# Patient Record
Sex: FEMALE | Race: OTHER | NOT HISPANIC OR LATINO | Employment: UNEMPLOYED | URBAN - METROPOLITAN AREA
[De-identification: names, ages, dates, MRNs, and addresses within clinical notes are randomized per-mention and may not be internally consistent; named-entity substitution may affect disease eponyms.]

---

## 2024-09-07 ENCOUNTER — HOSPITAL ENCOUNTER (EMERGENCY)
Facility: HOSPITAL | Age: 54
Discharge: HOME | End: 2024-09-07
Attending: EMERGENCY MEDICINE

## 2024-09-07 VITALS
RESPIRATION RATE: 18 BRPM | DIASTOLIC BLOOD PRESSURE: 80 MMHG | TEMPERATURE: 99 F | BODY MASS INDEX: 34.37 KG/M2 | OXYGEN SATURATION: 97 % | WEIGHT: 194 LBS | HEART RATE: 72 BPM | SYSTOLIC BLOOD PRESSURE: 137 MMHG

## 2024-09-07 DIAGNOSIS — G51.0 BELL'S PALSY: Primary | ICD-10-CM

## 2024-09-07 DIAGNOSIS — R29.818 ACUTE FOCAL NEUROLOGICAL DEFICIT: ICD-10-CM

## 2024-09-07 LAB
ALBUMIN SERPL BCP-MCNC: 4 G/DL (ref 3.5–5.2)
ALP SERPL-CCNC: 93 U/L (ref 55–135)
ALT SERPL W/O P-5'-P-CCNC: 19 U/L (ref 10–44)
ANION GAP SERPL CALC-SCNC: 19 MMOL/L (ref 8–16)
AST SERPL-CCNC: 19 U/L (ref 10–40)
BASOPHILS # BLD AUTO: 0.04 K/UL (ref 0–0.2)
BASOPHILS NFR BLD: 0.5 % (ref 0–1.9)
BILIRUB SERPL-MCNC: 0.4 MG/DL (ref 0.1–1)
BUN SERPL-MCNC: 15 MG/DL (ref 6–20)
CALCIUM SERPL-MCNC: 10.3 MG/DL (ref 8.7–10.5)
CHLORIDE SERPL-SCNC: 102 MMOL/L (ref 95–110)
CHOLEST SERPL-MCNC: 173 MG/DL (ref 120–199)
CHOLEST/HDLC SERPL: 3.8 {RATIO} (ref 2–5)
CO2 SERPL-SCNC: 21 MMOL/L (ref 23–29)
CREAT SERPL-MCNC: 0.8 MG/DL (ref 0.5–1.4)
DIFFERENTIAL METHOD BLD: NORMAL
EOSINOPHIL # BLD AUTO: 0.2 K/UL (ref 0–0.5)
EOSINOPHIL NFR BLD: 2.8 % (ref 0–8)
ERYTHROCYTE [DISTWIDTH] IN BLOOD BY AUTOMATED COUNT: 12.6 % (ref 11.5–14.5)
EST. GFR  (NO RACE VARIABLE): >60 ML/MIN/1.73 M^2
GLUCOSE SERPL-MCNC: 173 MG/DL (ref 70–110)
HCT VFR BLD AUTO: 38.6 % (ref 37–48.5)
HDLC SERPL-MCNC: 45 MG/DL (ref 40–75)
HDLC SERPL: 26 % (ref 20–50)
HGB BLD-MCNC: 13.4 G/DL (ref 12–16)
IMM GRANULOCYTES # BLD AUTO: 0.02 K/UL (ref 0–0.04)
IMM GRANULOCYTES NFR BLD AUTO: 0.3 % (ref 0–0.5)
INR PPP: 1 (ref 0.8–1.2)
LDLC SERPL CALC-MCNC: 62.4 MG/DL (ref 63–159)
LYMPHOCYTES # BLD AUTO: 2.8 K/UL (ref 1–4.8)
LYMPHOCYTES NFR BLD: 35.6 % (ref 18–48)
MCH RBC QN AUTO: 30.8 PG (ref 27–31)
MCHC RBC AUTO-ENTMCNC: 34.7 G/DL (ref 32–36)
MCV RBC AUTO: 89 FL (ref 82–98)
MONOCYTES # BLD AUTO: 0.5 K/UL (ref 0.3–1)
MONOCYTES NFR BLD: 5.9 % (ref 4–15)
NEUTROPHILS # BLD AUTO: 4.4 K/UL (ref 1.8–7.7)
NEUTROPHILS NFR BLD: 54.9 % (ref 38–73)
NONHDLC SERPL-MCNC: 128 MG/DL
NRBC BLD-RTO: 0 /100 WBC
PLATELET # BLD AUTO: 158 K/UL (ref 150–450)
PMV BLD AUTO: 11.2 FL (ref 9.2–12.9)
POCT GLUCOSE: 173 MG/DL (ref 70–110)
POTASSIUM SERPL-SCNC: 4 MMOL/L (ref 3.5–5.1)
PROT SERPL-MCNC: 7.5 G/DL (ref 6–8.4)
PROTHROMBIN TIME: 10.9 SEC (ref 9–12.5)
RBC # BLD AUTO: 4.35 M/UL (ref 4–5.4)
SODIUM SERPL-SCNC: 142 MMOL/L (ref 136–145)
TRIGL SERPL-MCNC: 328 MG/DL (ref 30–150)
TSH SERPL DL<=0.005 MIU/L-ACNC: 3.26 UIU/ML (ref 0.4–4)
WBC # BLD AUTO: 7.93 K/UL (ref 3.9–12.7)

## 2024-09-07 PROCEDURE — 99285 EMERGENCY DEPT VISIT HI MDM: CPT | Mod: 25

## 2024-09-07 PROCEDURE — 82962 GLUCOSE BLOOD TEST: CPT

## 2024-09-07 PROCEDURE — 80061 LIPID PANEL: CPT | Performed by: EMERGENCY MEDICINE

## 2024-09-07 PROCEDURE — 84443 ASSAY THYROID STIM HORMONE: CPT | Performed by: EMERGENCY MEDICINE

## 2024-09-07 PROCEDURE — 25000003 PHARM REV CODE 250: Performed by: EMERGENCY MEDICINE

## 2024-09-07 PROCEDURE — 85610 PROTHROMBIN TIME: CPT | Performed by: EMERGENCY MEDICINE

## 2024-09-07 PROCEDURE — 93005 ELECTROCARDIOGRAM TRACING: CPT

## 2024-09-07 PROCEDURE — 85025 COMPLETE CBC W/AUTO DIFF WBC: CPT | Performed by: EMERGENCY MEDICINE

## 2024-09-07 PROCEDURE — 93010 ELECTROCARDIOGRAM REPORT: CPT | Mod: ,,, | Performed by: STUDENT IN AN ORGANIZED HEALTH CARE EDUCATION/TRAINING PROGRAM

## 2024-09-07 PROCEDURE — 63600175 PHARM REV CODE 636 W HCPCS: Performed by: EMERGENCY MEDICINE

## 2024-09-07 PROCEDURE — G0508 CRIT CARE TELEHEA CONSULT 60: HCPCS | Mod: GT,,, | Performed by: STUDENT IN AN ORGANIZED HEALTH CARE EDUCATION/TRAINING PROGRAM

## 2024-09-07 PROCEDURE — 80053 COMPREHEN METABOLIC PANEL: CPT | Performed by: EMERGENCY MEDICINE

## 2024-09-07 RX ORDER — VALACYCLOVIR HYDROCHLORIDE 1 G/1
1000 TABLET, FILM COATED ORAL 3 TIMES DAILY
Qty: 21 TABLET | Refills: 0 | Status: SHIPPED | OUTPATIENT
Start: 2024-09-07 | End: 2024-09-14

## 2024-09-07 RX ORDER — PREDNISONE 20 MG/1
60 TABLET ORAL
Status: COMPLETED | OUTPATIENT
Start: 2024-09-07 | End: 2024-09-07

## 2024-09-07 RX ORDER — ERYTHROMYCIN 5 MG/G
OINTMENT OPHTHALMIC
Status: COMPLETED | OUTPATIENT
Start: 2024-09-07 | End: 2024-09-07

## 2024-09-07 RX ORDER — PREDNISONE 20 MG/1
60 TABLET ORAL DAILY
Qty: 10 TABLET | Refills: 0 | Status: SHIPPED | OUTPATIENT
Start: 2024-09-07 | End: 2024-09-14

## 2024-09-07 RX ADMIN — ERYTHROMYCIN: 5 OINTMENT OPHTHALMIC at 01:09

## 2024-09-07 RX ADMIN — PREDNISONE 60 MG: 20 TABLET ORAL at 03:09

## 2024-09-07 NOTE — SUBJECTIVE & OBJECTIVE
HPI:  53 y.o. female with medical history of HTN, DM, HLD, anxiety, depression, substance abuse - with admission concerns of focal neurological deficits. And Stroke code / neurology called in for the same.      History from patient and medical records review. Symptoms of left facial weakness - weakness in left eyelid closure / mild orbicularis oris weakness with associated left facial numbness >4.5 hrs prior to ED arrival. No other focal motor or sensory or cranial nerve deficits.      No compressive neuropathy pattern of presentation. No associated complex headaches or clinical seizures. No similar events in the past. No history of strokes, seizures or migraines.     Exam: awake, alert, following commands, no obvious aphasia or neglect or visual field deficits, no focal motor or sensory. Notable mild weakness in left orbicularis oris and ocularis / flattening of nasolabial fold and left lower facial numbness - without affect in taster or autonomic dysfunction.     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  Low suspicion for focal cerebrovascular ischemic event or secondary neuro inflammatory etiology - however shall rule it out       - Initiate treatment for peripheral facial nerve palsy - prednisone (60 to 80 mg/day) for one week + valacyclovir 1000 mg three times daily for one week vs acyclovir 400 mg five times daily for 10 days     - MRI brain WO contrast  - MRA brain and neck WO contrast     - Need no secondary stroke prevention at this moment   - euglycemic goals / eunatremic and euthermic goals   - eyecare / PT as needed for facial weakness management  - F/u OP neurology      If MRI brain positive for acute infarct follow the below   -  / Plavix 300 mg load - followed by DAPT X 21 day, ASA 81/Plavix 75,  with asa thereafter  - target LDL < 70 / Continue atorvastatin  - euglycemic goals   - permissive HTN x 72 hrs post index event / long term < 140/90  - Echo f/u   - PT/OT recs - discharge planning   - F/u PCP for risk factor modification monitoring  -  on DASH diet; exercise and lifestyle modifications when appropriate   - Provide stroke counseling during the visit - Identification of signs; emergency action and ER attention; Risk factor control, optimization for secondary stroke prevention; Compliance to medications   - F/u tele vascular neurology        Lytics recommendation: Thrombolytic therapy not recommended due to Suspected stroke mimic  and Mild Non-Disabling Symptoms  Thrombectomy recommendation: No; at this time symptoms not suggestive of large vessel occlusion  Placement recommendation: do not transfer

## 2024-09-07 NOTE — ED PROVIDER NOTES
"SCRIBE #1 NOTE: I, Vlad Frye, am scribing for, and in the presence of, Lyle Martinez MD. I have scribed the entire note.       History     Chief Complaint   Patient presents with    Facial Droop     C/o left facial droop, and left eye droop. Facial droop started last night, around 8 pm. Hx of DM. Dr. Martinez at triage to evaluate.     Review of patient's allergies indicates:  No Known Allergies      History of Present Illness     HPI    9/7/2024, 1:20 PM  History obtained from the patient      History of Present Illness: Nilam Roberts is a 53 y.o. female patient with a PMHx of neuropathy, HTN, DM, drug addiction, chronic back pain, and psychiatric problems who presents to the Emergency Department for evaluation of facial droop which onset gradually last night around 8:00 pm. Pt states she was watching TV when she felt her glasses become tight. Pt reports that she had a URI a few months ago. Symptoms are constant and moderate in severity. No mitigating or exacerbating factors reported. Associated sxs include L vision disturbance, L eye drooping, discomfort/tightness to L side of her head, shoulder weakness, and this morning pt noticed ear L mouth was drooping. Patient denies any cough, congestion, ear pain, and all other sxs at this time. No prior Tx reported. No further complaints or concerns at this time.       Arrival mode: Personal vehicle    PCP: No, Primary Doctor        Past Medical History:  Past Medical History:   Diagnosis Date    Addiction to drug     Anxiety     Chronic back pain     Depression     Diabetes mellitus     Drug abuse and dependence     Crack & Prescription Drugs ("Roxicodone")    Ectopic pregnancy     GERD (gastroesophageal reflux disease)     History of psychiatric hospitalization     Hx of psychiatric care     Hypercholesteremia     Hypertension     Lien     Neuropathy     Psychiatric problem     PTSD (post-traumatic stress disorder)     Sleep difficulties     Suicide attempt     " "Therapy     Tobacco use disorder     Withdrawal symptoms, drug or narcotic        Past Surgical History:  Past Surgical History:   Procedure Laterality Date     SECTION      CHOLECYSTECTOMY      HERNIA REPAIR      HYSTERECTOMY      OOPHORECTOMY           Family History:  Family History   Problem Relation Name Age of Onset    Heart attack Mother      Dementia Mother      Depression Mother      Diabetes Father      Heart attack Father      ADD / ADHD Sister      Drug abuse Sister      ADD / ADHD Brother      Drug abuse Brother      Alcohol abuse Maternal Grandfather         Social History:  Social History     Tobacco Use    Smoking status: Not on file    Smokeless tobacco: Never   Substance and Sexual Activity    Alcohol use: No    Drug use: No     Types: Cocaine, "Crack" cocaine, Benzodiazepines     Comment: pain pills, "smokes crack", reports taking her friends Roxicodone    Sexual activity: Yes     Birth control/protection: See Surgical Hx        Review of Systems     Review of Systems   Constitutional:  Negative for fever.   HENT:  Negative for congestion, ear pain and sore throat.    Eyes:  Positive for visual disturbance (L eye).   Respiratory:  Negative for cough and shortness of breath.    Cardiovascular:  Negative for chest pain.   Gastrointestinal:  Negative for nausea.   Genitourinary:  Negative for dysuria.   Musculoskeletal:  Negative for back pain.        (+) discomfort/tightness to left side of head   Skin:  Negative for rash.   Neurological:  Positive for facial asymmetry (L eye and mouth drooping). Negative for weakness.   Hematological:  Does not bruise/bleed easily.   All other systems reviewed and are negative.       Physical Exam     Initial Vitals [24 1316]   BP Pulse Resp Temp SpO2   (!) 178/80 77 16 98.6 °F (37 °C) 95 %      MAP       --          Physical Exam  Nursing Notes and Vital Signs Reviewed.  Constitutional: Patient is in mild distress. Well-developed and " well-nourished.  Head: Atraumatic. Normocephalic.  Eyes: PERRL. EOM intact. Conjunctivae are not pale. No scleral icterus.  ENT: Mucous membranes are moist. Oropharynx is clear and symmetric.  L ear canal lesion free.   Neck: Supple. Full ROM. No lymphadenopathy.  Cardiovascular: Regular rate. Regular rhythm. No murmurs, rubs, or gallops. Distal pulses are 2+ and symmetric.  Pulmonary/Chest: No respiratory distress. Clear to auscultation bilaterally. No wheezing or rales.  Abdominal: Soft and non-distended.  There is no tenderness.  No rebound, guarding, or rigidity. Good bowel sounds.  Genitourinary: No CVA tenderness  Musculoskeletal: Moves all extremities. No obvious deformities. No edema. No calf tenderness.  Skin: Warm and dry. No rash on face or scalp.  Neurological:  Alert, awake, and appropriate.  Normal speech.  NIH scale 2. Unable to close left eye completely. Excessive tearing in left eye.   Psychiatric: Normal affect. Good eye contact. Appropriate in content.     ED Course   Critical Care    Date/Time: 9/7/2024 1:31 PM    Performed by: Lyle Martinez MD  Authorized by: Lyle Martinez MD  Direct patient critical care time: 9 minutes  Additional history critical care time: 6 minutes  Ordering / reviewing critical care time: 7 minutes  Documentation critical care time: 8 minutes  Consulting other physicians critical care time: 4 minutes  Consult with family critical care time: 6 minutes  Other critical care time: 3 minutes  Total critical care time (exclusive of procedural time) : 43 minutes  Critical care time was exclusive of separately billable procedures and treating other patients and teaching time.  Critical care was necessary to treat or prevent imminent or life-threatening deterioration of the following conditions: potential stroke.  Critical care was time spent personally by me on the following activities: blood draw for specimens, development of treatment plan with patient or surrogate,  discussions with consultants, interpretation of cardiac output measurements, evaluation of patient's response to treatment, obtaining history from patient or surrogate, examination of patient, ordering and performing treatments and interventions, ordering and review of laboratory studies, pulse oximetry, ordering and review of radiographic studies, re-evaluation of patient's condition and review of old charts.        ED Vital Signs:  Vitals:    09/07/24 1316 09/07/24 1345 09/07/24 1445 09/07/24 1530   BP: (!) 178/80 135/71 136/65 134/64   Pulse: 77 72 74 75   Resp: 16 19 (!) 21 19   Temp: 98.6 °F (37 °C)      TempSrc: Oral      SpO2: 95% 96% 96% 97%   Weight: 88 kg (194 lb 0.1 oz)       09/07/24 1600   BP: 137/80   Pulse: 72   Resp: 18   Temp:    TempSrc:    SpO2: 97%   Weight:        Abnormal Lab Results:  Labs Reviewed   COMPREHENSIVE METABOLIC PANEL - Abnormal       Result Value    Sodium 142      Potassium 4.0      Chloride 102      CO2 21 (*)     Glucose 173 (*)     BUN 15      Creatinine 0.8      Calcium 10.3      Total Protein 7.5      Albumin 4.0      Total Bilirubin 0.4      Alkaline Phosphatase 93      AST 19      ALT 19      eGFR >60      Anion Gap 19 (*)    LIPID PANEL - Abnormal    Cholesterol 173      Triglycerides 328 (*)     HDL 45      LDL Cholesterol 62.4 (*)     HDL/Cholesterol Ratio 26.0      Total Cholesterol/HDL Ratio 3.8      Non-HDL Cholesterol 128     CBC W/ AUTO DIFFERENTIAL    WBC 7.93      RBC 4.35      Hemoglobin 13.4      Hematocrit 38.6      MCV 89      MCH 30.8      MCHC 34.7      RDW 12.6      Platelets 158      MPV 11.2      Immature Granulocytes 0.3      Gran # (ANC) 4.4      Immature Grans (Abs) 0.02      Lymph # 2.8      Mono # 0.5      Eos # 0.2      Baso # 0.04      nRBC 0      Gran % 54.9      Lymph % 35.6      Mono % 5.9      Eosinophil % 2.8      Basophil % 0.5      Differential Method Automated     PROTIME-INR    Prothrombin Time 10.9      INR 1.0     TSH    TSH 3.263           All Lab Results:  Results for orders placed or performed during the hospital encounter of 09/07/24   CBC W/ AUTO DIFFERENTIAL   Result Value Ref Range    WBC 7.93 3.90 - 12.70 K/uL    RBC 4.35 4.00 - 5.40 M/uL    Hemoglobin 13.4 12.0 - 16.0 g/dL    Hematocrit 38.6 37.0 - 48.5 %    MCV 89 82 - 98 fL    MCH 30.8 27.0 - 31.0 pg    MCHC 34.7 32.0 - 36.0 g/dL    RDW 12.6 11.5 - 14.5 %    Platelets 158 150 - 450 K/uL    MPV 11.2 9.2 - 12.9 fL    Immature Granulocytes 0.3 0.0 - 0.5 %    Gran # (ANC) 4.4 1.8 - 7.7 K/uL    Immature Grans (Abs) 0.02 0.00 - 0.04 K/uL    Lymph # 2.8 1.0 - 4.8 K/uL    Mono # 0.5 0.3 - 1.0 K/uL    Eos # 0.2 0.0 - 0.5 K/uL    Baso # 0.04 0.00 - 0.20 K/uL    nRBC 0 0 /100 WBC    Gran % 54.9 38.0 - 73.0 %    Lymph % 35.6 18.0 - 48.0 %    Mono % 5.9 4.0 - 15.0 %    Eosinophil % 2.8 0.0 - 8.0 %    Basophil % 0.5 0.0 - 1.9 %    Differential Method Automated    Comprehensive metabolic panel   Result Value Ref Range    Sodium 142 136 - 145 mmol/L    Potassium 4.0 3.5 - 5.1 mmol/L    Chloride 102 95 - 110 mmol/L    CO2 21 (L) 23 - 29 mmol/L    Glucose 173 (H) 70 - 110 mg/dL    BUN 15 6 - 20 mg/dL    Creatinine 0.8 0.5 - 1.4 mg/dL    Calcium 10.3 8.7 - 10.5 mg/dL    Total Protein 7.5 6.0 - 8.4 g/dL    Albumin 4.0 3.5 - 5.2 g/dL    Total Bilirubin 0.4 0.1 - 1.0 mg/dL    Alkaline Phosphatase 93 55 - 135 U/L    AST 19 10 - 40 U/L    ALT 19 10 - 44 U/L    eGFR >60 >60 mL/min/1.73 m^2    Anion Gap 19 (H) 8 - 16 mmol/L   Protime-INR   Result Value Ref Range    Prothrombin Time 10.9 9.0 - 12.5 sec    INR 1.0 0.8 - 1.2   TSH   Result Value Ref Range    TSH 3.263 0.400 - 4.000 uIU/mL   LDL - Lipid Panel   Result Value Ref Range    Cholesterol 173 120 - 199 mg/dL    Triglycerides 328 (H) 30 - 150 mg/dL    HDL 45 40 - 75 mg/dL    LDL Cholesterol 62.4 (L) 63.0 - 159.0 mg/dL    HDL/Cholesterol Ratio 26.0 20.0 - 50.0 %    Total Cholesterol/HDL Ratio 3.8 2.0 - 5.0    Non-HDL Cholesterol 128 mg/dL        Imaging  Results:  Imaging Results              MRA Brain without contrast (Final result)  Result time 09/07/24 14:44:17      Final result by Reddy Suarez MD (09/07/24 14:44:17)                   Impression:      No acute intracranial abnormality.    No significant arterial abnormalities.      Electronically signed by: Reddy Suarez  Date:    09/07/2024  Time:    14:44               Narrative:    EXAMINATION:  MRI BRAIN WITHOUT CONTRAST; MRA BRAIN WITHOUT CONTRAST    CLINICAL HISTORY:  Neuro deficit, acute, stroke suspected;Acute focal neurological deficit;; Stroke/TIA, determine embolic source;    TECHNIQUE:  Multiplanar multisequence MR imaging of the brain was performed without contrast.  By separate acquisition, noncontrast MR angiography was performed of the intracranial vasculature with 3D time-of-flight technique through the [place] [facility] [place], with MIP reformatting.    COMPARISON:  Multiple    FINDINGS:  Intracranial Compartment:    Ventricles and sulci are normal in size for age without evidence of hydrocephalus. No extra-axial blood or fluid collections.    The brain parenchyma appears normal. No mass lesion, acute hemorrhage, edema, or acute infarct.    Skull/Extracranial Contents (limited evaluation): Bone marrow signal intensity is normal.    MRA (Intracranial Arteries):ICA terminus intact bilaterally.  Right MCA appears intact without aneurysm, severe stenosis, or occlusion.  Left MCA appears intact without aneurysm, severe stenosis, or occlusion.  Bilateral SOCORRO appear intact.  No aneurysm, severe stenosis, or occlusion.    Intracranial vertebrobasilar circulation appears unremarkable and co dominant vertebral arteries are noted.  No aneurysm, severe stenosis, or occlusion.  Bilateral PCA appear intact.                                       MRI Brain Without Contrast (Final result)  Result time 09/07/24 14:44:17      Final result by Reddy Suarez MD (09/07/24 14:44:17)                   Impression:       No acute intracranial abnormality.    No significant arterial abnormalities.      Electronically signed by: Reddy Suarez  Date:    09/07/2024  Time:    14:44               Narrative:    EXAMINATION:  MRI BRAIN WITHOUT CONTRAST; MRA BRAIN WITHOUT CONTRAST    CLINICAL HISTORY:  Neuro deficit, acute, stroke suspected;Acute focal neurological deficit;; Stroke/TIA, determine embolic source;    TECHNIQUE:  Multiplanar multisequence MR imaging of the brain was performed without contrast.  By separate acquisition, noncontrast MR angiography was performed of the intracranial vasculature with 3D time-of-flight technique through the [place] [facility] [place], with MIP reformatting.    COMPARISON:  Multiple    FINDINGS:  Intracranial Compartment:    Ventricles and sulci are normal in size for age without evidence of hydrocephalus. No extra-axial blood or fluid collections.    The brain parenchyma appears normal. No mass lesion, acute hemorrhage, edema, or acute infarct.    Skull/Extracranial Contents (limited evaluation): Bone marrow signal intensity is normal.    MRA (Intracranial Arteries):ICA terminus intact bilaterally.  Right MCA appears intact without aneurysm, severe stenosis, or occlusion.  Left MCA appears intact without aneurysm, severe stenosis, or occlusion.  Bilateral SOCORRO appear intact.  No aneurysm, severe stenosis, or occlusion.    Intracranial vertebrobasilar circulation appears unremarkable and co dominant vertebral arteries are noted.  No aneurysm, severe stenosis, or occlusion.  Bilateral PCA appear intact.                                       CT Head Without Contrast (Final result)  Result time 09/07/24 13:38:14      Final result by Reddy Suarez MD (09/07/24 13:38:14)                   Impression:      No hemorrhage or acute major vascular distribution infarction.  Further evaluation as warranted.    All CT scans at this facility are performed  using dose modulation techniques as appropriate to performed  exam including the following:  automated exposure control; adjustment of mA and/or kV according to the patients size (this includes techniques or standardized protocols for targeted exams where dose is matched to indication/reason for exam: i.e. extremities or head);  iterative reconstruction technique.      Electronically signed by: Reddy Suarez  Date:    09/07/2024  Time:    13:38               Narrative:    EXAMINATION:  CT HEAD WITHOUT CONTRAST    CLINICAL HISTORY:  Neuro deficit, acute, stroke suspected;    TECHNIQUE:  Low dose axial CT images obtained throughout the head without intravenous contrast. Sagittal and coronal reconstructions were performed.    COMPARISON:  None.    FINDINGS:  Intracranial compartment:    Ventricles and sulci are normal in size for age without evidence of hydrocephalus. No extra-axial blood or fluid collections.    The brain parenchyma appears normal. No parenchymal mass, hemorrhage, edema or major vascular distribution infarct.    Skull/extracranial contents (limited evaluation): No fracture. Mastoid air cells and paranasal sinuses are essentially clear.                                       The EKG was ordered, reviewed, and independently interpreted by the ED provider.  Interpretation time: 1:39  Rate: 72 BPM  Rhythm: normal sinus rhythm  Interpretation: Low voltage QRS. No acute ST changes. No STEMI.         The Emergency Provider reviewed the vital signs and test results, which are outlined above.     ED Discussion       3:28 PM: Reassessed pt at this time. Discussed with pt all pertinent ED information and results. Discussed pt dx and plan of tx. Gave pt all f/u and return to the ED instructions. All questions and concerns were addressed at this time. Pt expresses understanding of information and instructions, and is comfortable with plan to discharge. Pt is stable for discharge.    I discussed with patient and/or family/caretaker that evaluation in the ED does not suggest  any emergent or life threatening medical conditions requiring immediate intervention beyond what was provided in the ED, and I believe patient is safe for discharge.  Regardless, an unremarkable evaluation in the ED does not preclude the development or presence of a serious of life threatening condition. As such, patient was instructed to return immediately for any worsening or change in current symptoms.     ED Course as of 09/07/24 1947   Sat Sep 07, 2024   1949 DDx includes CVA, [place] palsy [BA]      ED Course User Index  [BA] Lyle Martinez MD     Medical Decision Making  Amount and/or Complexity of Data Reviewed  Labs: ordered. Decision-making details documented in ED Course.  Radiology: ordered. Decision-making details documented in ED Course.  ECG/medicine tests: ordered and independent interpretation performed. Decision-making details documented in ED Course.    Risk  Prescription drug management.    Critical Care  Total time providing critical care: 43 minutes       Additional MDM:     NIH Stroke Scale:   Interval = baseline (upon arrival/admit)  Level of consciousness = 0 - alert  LOC questions = 0 - answers both correctly  LOC commands = 0 - performs both correctly  Best gaze = 0 - normal  Visual = 0 - no visual loss  Facial palsy = 2 - partial  Motor left arm =  0 - no drift  Motor right arm =  0 - no drift  Motor left leg = 0 - no drift  Motor right leg =  0 - no drift  Limb ataxia = 0 - absent  Sensory = 0 - normal  Best language = 0 - no aphasia  Dysarthria = 0 - normal articulation  Extinction and inattention = 0 - no neglect  NIH Stroke Scale Total = 2              ED Medication(s):  Medications   erythromycin 5 mg/gram (0.5 %) ophthalmic ointment ( Left Eye Given 9/7/24 1330)   predniSONE tablet 60 mg (60 mg Oral Given 9/7/24 1530)       Discharge Medication List as of 9/7/2024  3:28 PM        START taking these medications    Details   predniSONE (DELTASONE) 20 MG tablet Take 3 tablets (60 mg  total) by mouth once daily. for 7 days, Starting Sat 9/7/2024, Until Sat 9/14/2024, Print      valACYclovir (VALTREX) 1000 MG tablet Take 1 tablet (1,000 mg total) by mouth 3 (three) times daily. for 7 days, Starting Sat 9/7/2024, Until Sat 9/14/2024, Print              Follow-up Information       Your Primary Care Provider. Schedule an appointment as soon as possible for a visit in 2 days.    Why: For re-evaluation and further treatment             [facility] - Emergency Dept.. Go today.    Specialty: Emergency Medicine  Why: If symptoms worsen, For re-evaluation and further treatment, As needed  Contact information:  [id number] [facility] [address] [phone]                               Scribe Attestation:   Scribe #1: I performed the above scribed service and the documentation accurately describes the services I performed. I attest to the accuracy of the note.     Attending:   Physician Attestation Statement for Scribe #1: I, Lyle Martinez MD, personally performed the services described in this documentation, as scribed by Vlad Frye, in my presence, and it is both accurate and complete.           Clinical Impression       ICD-10-CM ICD-9-CM   1. Bell's palsy  G51.0 351.0   2. Acute focal neurological deficit  R29.818 781.99       Disposition:   Disposition: Discharged  Condition: Stable        Lyle Martinez MD  09/07/24 1947

## 2024-09-07 NOTE — TELEMEDICINE CONSULT
[facility]  Vascular Neurology  Comprehensive Stroke Center  TeleVascular Neurology Acute Consultation Note        Consult Information  Consult to Telemedicine - Acute Stroke  Consult performed by: Karly Hernandez MD  Consult ordered by: Mary Martinez MD          Consulting Provider: MARY MARTINEZ   Current Providers  No providers found    Patient Location:  [facility] EMERGENCY DEPARTMENT Emergency Department    Spoke hospital nurse at bedside with patient assisting consultant.  Patient information was obtained from patient.       Stroke Documentation  Acute Stroke Times   Last Known Normal Date: 09/06/24  Last Known Normal Time: 2000  Symptom Onset Date: 09/06/24  Symptom Onset Time: 2000  Stroke Team Called Date: 09/07/24  Stroke Team Called Time: 1324  Stroke Team Arrival Date: 09/07/24  Stroke Team Arrival Time: 1325  CT Interpretation Time: 1330  Thrombolytic Therapy Recommended: No  Thrombectomy Recommended: No    NIH Scale:  1a. Level of Consciousness: 0-->Alert, keenly responsive  1b. LOC Questions: 0-->Answers both questions correctly  1c. LOC Commands: 0-->Performs both tasks correctly  2. Best Gaze: 0-->Normal  3. Visual: 0-->No visual loss  4. Facial Palsy: 1-->Minor paralysis (flattened nasolabial fold, asymmetry on smiling)  5a. Motor Arm, Left: 0-->No drift, limb holds 90 (or 45) degrees for full 10 secs  5b. Motor Arm, Right: 0-->No drift, limb holds 90 (or 45) degrees for full 10 secs  6a. Motor Leg, Left: 0-->No drift, leg holds 30 degree position for full 5 secs  6b. Motor Leg, Right: 0-->No drift, leg holds 30 degree position for full 5 secs  7. Limb Ataxia: 0-->Absent  8. Sensory: 1-->Mild-to-moderate sensory loss, patient feels pinprick is less sharp or is dull on the affected side, or there is a loss of superficial pain with pinprick, but patient is aware of being touched  9. Best Language: 0-->No aphasia, normal  10. Dysarthria: 0-->Normal  11. Extinction and  Inattention (formerly Neglect): 0-->No abnormality  Total (NIH Stroke Scale): 2      Modified [place]:    [place] Coma Scale: 15   ABCD2 Score:    [id number] Score:    HAS -BLED Score:    ICH Score:    Hunt & Christianson Classification:      Blood pressure (!) 178/80, pulse 77, temperature 98.6 °F (37 °C), temperature source Oral, resp. rate 16, weight 88 kg (194 lb 0.1 oz), SpO2 95%.      In my opinion, this was a: Tier 1; VAN Stroke Assessment: Negative     Medical Decision Making  HPI:  53 y.o. female with medical history of HTN, DM, HLD, anxiety, depression, substance abuse - with admission concerns of focal neurological deficits. And Stroke code / neurology called in for the same.      History from patient and medical records review. Symptoms of left facial weakness - weakness in left eyelid closure / mild orbicularis oris weakness with associated left facial numbness >4.5 hrs prior to ED arrival. No other focal motor or sensory or cranial nerve deficits.      No compressive neuropathy pattern of presentation. No associated complex headaches or clinical seizures. No similar events in the past. No history of strokes, seizures or migraines.     Exam: awake, alert, following commands, no obvious aphasia or neglect or visual field deficits, no focal motor or sensory. Notable mild weakness in left orbicularis oris and ocularis / flattening of nasolabial fold and left lower facial numbness - without affect in taster or autonomic dysfunction.     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  Low suspicion for focal cerebrovascular ischemic event or secondary neuro inflammatory etiology - however shall rule it out       - Initiate treatment for peripheral facial nerve palsy - prednisone (60 to 80 mg/day) for one week + valacyclovir  "1000 mg three times daily for one week vs acyclovir 400 mg five times daily for 10 days     - MRI brain WO contrast  - MRA brain and neck WO contrast     - Need no secondary stroke prevention at this moment   - euglycemic goals / eunatremic and euthermic goals   - eyecare / PT as needed for facial weakness management  - F/u OP neurology      If MRI brain positive for acute infarct follow the below   -  / Plavix 300 mg load - followed by DAPT X 21 day, ASA 81/Plavix 75, with asa thereafter  - target LDL < 70 / Continue atorvastatin  - euglycemic goals   - permissive HTN x 72 hrs post index event / long term < 140/90  - Echo f/u   - PT/OT recs - discharge planning   - F/u PCP for risk factor modification monitoring  -  on DASH diet; exercise and lifestyle modifications when appropriate   - Provide stroke counseling during the visit - Identification of signs; emergency action and ER attention; Risk factor control, optimization for secondary stroke prevention; Compliance to medications   - F/u tele vascular neurology        Lytics recommendation: Thrombolytic therapy not recommended due to Suspected stroke mimic  and Mild Non-Disabling Symptoms  Thrombectomy recommendation: No; at this time symptoms not suggestive of large vessel occlusion  Placement recommendation: do not transfer         Past Medical History:   Diagnosis Date    Addiction to drug     Anxiety     Chronic back pain     Depression     Diabetes mellitus     Drug abuse and dependence     Crack & Prescription Drugs ("Roxicodone")    Ectopic pregnancy     GERD (gastroesophageal reflux disease)     History of psychiatric hospitalization     Hx of psychiatric care     Hypercholesteremia     Hypertension     Lien     Neuropathy     Psychiatric problem     PTSD (post-traumatic stress disorder)     Sleep difficulties     Suicide attempt     Therapy     Tobacco use disorder     Withdrawal symptoms, drug or narcotic      Past Surgical History: "   Procedure Laterality Date     SECTION      CHOLECYSTECTOMY      HERNIA REPAIR      HYSTERECTOMY      OOPHORECTOMY       Family History   Problem Relation Name Age of Onset    Heart attack Mother      Dementia Mother      Depression Mother      Diabetes Father      Heart attack Father      ADD / ADHD Sister      Drug abuse Sister      ADD / ADHD Brother      Drug abuse Brother      Alcohol abuse Maternal Grandfather         Diagnoses  Problem Noted   Focal Neurological Deficit 2024       Karly Hernandez MD      Emergent/Acute neurological consultation requested by spoke provider due to critical concerns for possible cerebrovascular event that could result in permanent loss of neurologic/bodily function, severe disability or death of this patient.  Immediate/timely evaluation by a highly prepared expert is paramount for optimal outcomes  High risk for neurological deterioration if not properly diagnosed  High risk for neurological deterioration if not treated promplty/as soon as possible  Complex diagnostic evaluation may be required (advanced imaging)  High risk treatment options (thrombolytics and/or thrombectomy)    Patient care was coordinated with spoke provider, including but not limted to    Discussing likely diagnosis/etiology of symptoms  Making recommendations for further diagnostic studies  Making recommendations for intravenous thrombolytics or other advanced therapies  Making recommendations for disposition (admission/transfer for higher level of care)

## 2024-09-09 LAB
OHS QRS DURATION: 80 MS
OHS QTC CALCULATION: 438 MS

## 2024-10-22 DIAGNOSIS — Z12.31 ENCOUNTER FOR SCREENING MAMMOGRAM FOR MALIGNANT NEOPLASM OF BREAST: Primary | ICD-10-CM

## 2024-11-19 ENCOUNTER — HOSPITAL ENCOUNTER (OUTPATIENT)
Dept: RADIOLOGY | Facility: HOSPITAL | Age: 54
Discharge: HOME | End: 2024-11-19
Attending: FAMILY MEDICINE
Payer: MEDICAID

## 2024-11-19 DIAGNOSIS — F17.200 TOBACCO USE DISORDER: Primary | ICD-10-CM

## 2024-11-19 DIAGNOSIS — Z12.31 ENCOUNTER FOR SCREENING MAMMOGRAM FOR MALIGNANT NEOPLASM OF BREAST: ICD-10-CM

## 2024-11-19 PROCEDURE — 77063 BREAST TOMOSYNTHESIS BI: CPT | Mod: 26,,, | Performed by: STUDENT IN AN ORGANIZED HEALTH CARE EDUCATION/TRAINING PROGRAM

## 2024-11-19 PROCEDURE — 77063 BREAST TOMOSYNTHESIS BI: CPT | Mod: TC

## 2024-11-19 PROCEDURE — 77067 SCR MAMMO BI INCL CAD: CPT | Mod: 26,,, | Performed by: STUDENT IN AN ORGANIZED HEALTH CARE EDUCATION/TRAINING PROGRAM

## 2024-11-21 ENCOUNTER — TELEPHONE (OUTPATIENT)
Dept: RADIOLOGY | Facility: HOSPITAL | Age: 54
End: 2024-11-21
Payer: MEDICAID

## 2024-12-30 ENCOUNTER — HOSPITAL ENCOUNTER (OUTPATIENT)
Dept: RADIOLOGY | Facility: HOSPITAL | Age: 54
Discharge: HOME | End: 2024-12-30
Attending: FAMILY MEDICINE
Payer: MEDICAID

## 2024-12-30 DIAGNOSIS — R92.8 ABNORMAL MAMMOGRAM: ICD-10-CM

## 2024-12-30 DIAGNOSIS — F17.200 TOBACCO USE DISORDER: ICD-10-CM

## 2024-12-30 PROCEDURE — 77061 BREAST TOMOSYNTHESIS UNI: CPT | Mod: TC,RT

## 2024-12-30 PROCEDURE — 71271 CT THORAX LUNG CANCER SCR C-: CPT | Mod: TC

## 2024-12-30 PROCEDURE — 71271 CT THORAX LUNG CANCER SCR C-: CPT | Mod: 26,,, | Performed by: RADIOLOGY

## 2024-12-30 PROCEDURE — 77061 BREAST TOMOSYNTHESIS UNI: CPT | Mod: 26,RT,, | Performed by: STUDENT IN AN ORGANIZED HEALTH CARE EDUCATION/TRAINING PROGRAM

## 2024-12-30 PROCEDURE — 77065 DX MAMMO INCL CAD UNI: CPT | Mod: 26,RT,, | Performed by: STUDENT IN AN ORGANIZED HEALTH CARE EDUCATION/TRAINING PROGRAM

## 2025-04-07 ENCOUNTER — HOSPITAL ENCOUNTER (OUTPATIENT)
Dept: RADIOLOGY | Facility: HOSPITAL | Age: 55
Discharge: HOME | End: 2025-04-07
Payer: MEDICAID

## 2025-04-07 DIAGNOSIS — K74.60 CIRRHOSIS OF LIVER WITHOUT ASCITES, UNSPECIFIED HEPATIC CIRRHOSIS TYPE: ICD-10-CM

## 2025-04-07 PROCEDURE — 76705 ECHO EXAM OF ABDOMEN: CPT | Mod: 26,,, | Performed by: RADIOLOGY

## 2025-04-07 PROCEDURE — 76705 ECHO EXAM OF ABDOMEN: CPT | Mod: TC
